# Patient Record
Sex: FEMALE | Race: WHITE | NOT HISPANIC OR LATINO | Employment: UNEMPLOYED | URBAN - METROPOLITAN AREA
[De-identification: names, ages, dates, MRNs, and addresses within clinical notes are randomized per-mention and may not be internally consistent; named-entity substitution may affect disease eponyms.]

---

## 2023-01-01 ENCOUNTER — APPOINTMENT (OUTPATIENT)
Dept: CARDIOLOGY | Facility: CLINIC | Age: 88
End: 2023-01-01
Payer: COMMERCIAL

## 2023-01-01 ENCOUNTER — APPOINTMENT (OUTPATIENT)
Dept: PRIMARY CARE | Facility: CLINIC | Age: 88
End: 2023-01-01
Payer: COMMERCIAL

## 2023-01-01 ENCOUNTER — ANCILLARY PROCEDURE (OUTPATIENT)
Dept: RADIOLOGY | Facility: CLINIC | Age: 88
End: 2023-01-01
Payer: COMMERCIAL

## 2023-01-01 ENCOUNTER — OFFICE VISIT (OUTPATIENT)
Dept: PRIMARY CARE | Facility: CLINIC | Age: 88
End: 2023-01-01
Payer: COMMERCIAL

## 2023-01-01 ENCOUNTER — LAB (OUTPATIENT)
Dept: LAB | Facility: LAB | Age: 88
End: 2023-01-01
Payer: COMMERCIAL

## 2023-01-01 ENCOUNTER — TELEPHONE (OUTPATIENT)
Dept: PRIMARY CARE | Facility: CLINIC | Age: 88
End: 2023-01-01
Payer: COMMERCIAL

## 2023-01-01 VITALS
DIASTOLIC BLOOD PRESSURE: 70 MMHG | TEMPERATURE: 96.4 F | OXYGEN SATURATION: 96 % | SYSTOLIC BLOOD PRESSURE: 108 MMHG | HEART RATE: 70 BPM | WEIGHT: 122 LBS | BODY MASS INDEX: 22.31 KG/M2

## 2023-01-01 DIAGNOSIS — Z00.00 ROUTINE GENERAL MEDICAL EXAMINATION AT HEALTH CARE FACILITY: ICD-10-CM

## 2023-01-01 DIAGNOSIS — Z13.29 THYROID DISORDER SCREENING: ICD-10-CM

## 2023-01-01 DIAGNOSIS — Z12.31 BREAST CANCER SCREENING BY MAMMOGRAM: ICD-10-CM

## 2023-01-01 DIAGNOSIS — K50.919 CROHN'S DISEASE WITH COMPLICATION, UNSPECIFIED GASTROINTESTINAL TRACT LOCATION (MULTI): ICD-10-CM

## 2023-01-01 DIAGNOSIS — N39.41 URGE INCONTINENCE: ICD-10-CM

## 2023-01-01 DIAGNOSIS — Z13.220 LIPID SCREENING: ICD-10-CM

## 2023-01-01 DIAGNOSIS — Z78.0 POST-MENOPAUSAL: ICD-10-CM

## 2023-01-01 DIAGNOSIS — E53.8 VITAMIN B 12 DEFICIENCY: ICD-10-CM

## 2023-01-01 DIAGNOSIS — Z00.00 MEDICARE ANNUAL WELLNESS VISIT, SUBSEQUENT: Primary | ICD-10-CM

## 2023-01-01 DIAGNOSIS — I10 BENIGN ESSENTIAL HYPERTENSION: ICD-10-CM

## 2023-01-01 LAB
ALBUMIN SERPL BCP-MCNC: 4.3 G/DL (ref 3.4–5)
ALP SERPL-CCNC: 54 U/L (ref 33–136)
ALT SERPL W P-5'-P-CCNC: 13 U/L (ref 7–45)
ANION GAP SERPL CALC-SCNC: 15 MMOL/L (ref 10–20)
AST SERPL W P-5'-P-CCNC: 19 U/L (ref 9–39)
BILIRUB SERPL-MCNC: 0.7 MG/DL (ref 0–1.2)
BUN SERPL-MCNC: 19 MG/DL (ref 6–23)
CALCIUM SERPL-MCNC: 9.7 MG/DL (ref 8.6–10.3)
CHLORIDE SERPL-SCNC: 103 MMOL/L (ref 98–107)
CHOLEST SERPL-MCNC: 143 MG/DL (ref 0–199)
CHOLESTEROL/HDL RATIO: 3.1
CO2 SERPL-SCNC: 29 MMOL/L (ref 21–32)
CREAT SERPL-MCNC: 0.8 MG/DL (ref 0.5–1.05)
ERYTHROCYTE [DISTWIDTH] IN BLOOD BY AUTOMATED COUNT: 13.4 % (ref 11.5–14.5)
GFR SERPL CREATININE-BSD FRML MDRD: 72 ML/MIN/1.73M*2
GLUCOSE SERPL-MCNC: 108 MG/DL (ref 74–99)
HCT VFR BLD AUTO: 33.6 % (ref 36–46)
HDLC SERPL-MCNC: 46.3 MG/DL
HGB BLD-MCNC: 11.3 G/DL (ref 12–16)
LDLC SERPL CALC-MCNC: 72 MG/DL (ref 140–190)
MCH RBC QN AUTO: 31.5 PG (ref 26–34)
MCHC RBC AUTO-ENTMCNC: 33.6 G/DL (ref 32–36)
MCV RBC AUTO: 94 FL (ref 80–100)
NON HDL CHOLESTEROL: 97 MG/DL (ref 0–149)
NRBC BLD-RTO: 0 /100 WBCS (ref 0–0)
PLATELET # BLD AUTO: 236 X10*3/UL (ref 150–450)
PMV BLD AUTO: 10.1 FL (ref 7.5–11.5)
POC APPEARANCE, URINE: CLEAR
POC BILIRUBIN, URINE: NEGATIVE
POC BLOOD, URINE: NEGATIVE
POC COLOR, URINE: NORMAL
POC GLUCOSE, URINE: NEGATIVE MG/DL
POC KETONES, URINE: NEGATIVE MG/DL
POC LEUKOCYTES, URINE: NEGATIVE
POC NITRITE,URINE: NEGATIVE
POC PH, URINE: 7 PH
POC PROTEIN, URINE: NEGATIVE MG/DL
POC SPECIFIC GRAVITY, URINE: 1.01
POC UROBILINOGEN, URINE: 1 EU/DL
POTASSIUM SERPL-SCNC: 4.1 MMOL/L (ref 3.5–5.3)
PROT SERPL-MCNC: 6.4 G/DL (ref 6.4–8.2)
RBC # BLD AUTO: 3.59 X10*6/UL (ref 4–5.2)
SODIUM SERPL-SCNC: 143 MMOL/L (ref 136–145)
TRIGL SERPL-MCNC: 122 MG/DL (ref 0–149)
TSH SERPL-ACNC: 1.09 MIU/L (ref 0.44–3.98)
VIT B12 SERPL-MCNC: 743 PG/ML (ref 211–911)
VLDL: 24 MG/DL (ref 0–40)
WBC # BLD AUTO: 7.2 X10*3/UL (ref 4.4–11.3)

## 2023-01-01 PROCEDURE — 36415 COLL VENOUS BLD VENIPUNCTURE: CPT

## 2023-01-01 PROCEDURE — 99213 OFFICE O/P EST LOW 20 MIN: CPT

## 2023-01-01 PROCEDURE — 80053 COMPREHEN METABOLIC PANEL: CPT

## 2023-01-01 PROCEDURE — 1170F FXNL STATUS ASSESSED: CPT

## 2023-01-01 PROCEDURE — 77080 DXA BONE DENSITY AXIAL: CPT | Performed by: RADIOLOGY

## 2023-01-01 PROCEDURE — 81002 URINALYSIS NONAUTO W/O SCOPE: CPT

## 2023-01-01 PROCEDURE — 80061 LIPID PANEL: CPT

## 2023-01-01 PROCEDURE — 3078F DIAST BP <80 MM HG: CPT

## 2023-01-01 PROCEDURE — 82607 VITAMIN B-12: CPT

## 2023-01-01 PROCEDURE — 1159F MED LIST DOCD IN RCRD: CPT

## 2023-01-01 PROCEDURE — 1160F RVW MEDS BY RX/DR IN RCRD: CPT

## 2023-01-01 PROCEDURE — 3074F SYST BP LT 130 MM HG: CPT

## 2023-01-01 PROCEDURE — 85027 COMPLETE CBC AUTOMATED: CPT

## 2023-01-01 PROCEDURE — 77080 DXA BONE DENSITY AXIAL: CPT

## 2023-01-01 PROCEDURE — G0439 PPPS, SUBSEQ VISIT: HCPCS

## 2023-01-01 PROCEDURE — 84443 ASSAY THYROID STIM HORMONE: CPT

## 2023-01-01 PROCEDURE — G0444 DEPRESSION SCREEN ANNUAL: HCPCS

## 2023-01-01 RX ORDER — LABETALOL 300 MG/1
1 TABLET, FILM COATED ORAL EVERY 12 HOURS
COMMUNITY
Start: 2016-01-25

## 2023-01-01 RX ORDER — SULFAMETHOXAZOLE AND TRIMETHOPRIM 800; 160 MG/1; MG/1
1 TABLET ORAL 2 TIMES DAILY
COMMUNITY
Start: 2022-08-30 | End: 2023-01-01 | Stop reason: ALTCHOICE

## 2023-01-01 RX ORDER — BENZONATATE 200 MG/1
200 CAPSULE ORAL 3 TIMES DAILY PRN
COMMUNITY
Start: 2022-01-01 | End: 2023-01-01 | Stop reason: ALTCHOICE

## 2023-01-01 RX ORDER — NEBULIZER AND COMPRESSOR
1 EACH MISCELLANEOUS DAILY
Qty: 1 EACH | Refills: 0 | Status: SHIPPED | OUTPATIENT
Start: 2023-01-01 | End: 2023-01-01 | Stop reason: WASHOUT

## 2023-01-01 RX ORDER — MULTIVITAMIN
1 TABLET ORAL DAILY
COMMUNITY

## 2023-01-01 RX ORDER — GABAPENTIN 300 MG/1
1 CAPSULE ORAL EVERY 8 HOURS
COMMUNITY
Start: 2016-07-14

## 2023-01-01 RX ORDER — VALSARTAN AND HYDROCHLOROTHIAZIDE 320; 25 MG/1; MG/1
1 TABLET, FILM COATED ORAL DAILY
COMMUNITY
Start: 2016-01-25

## 2023-01-01 RX ORDER — DEXTROMETHORPHAN HYDROBROMIDE, GUAIFENESIN 5; 100 MG/5ML; MG/5ML
2 LIQUID ORAL 2 TIMES DAILY
COMMUNITY
Start: 2019-01-14

## 2023-01-01 RX ORDER — OSELTAMIVIR PHOSPHATE 75 MG/1
75 CAPSULE ORAL 2 TIMES DAILY
COMMUNITY
Start: 2022-01-01 | End: 2023-01-01 | Stop reason: ALTCHOICE

## 2023-01-01 ASSESSMENT — ACTIVITIES OF DAILY LIVING (ADL)
MANAGING_FINANCES: NEEDS ASSISTANCE
TAKING_MEDICATION: INDEPENDENT
DRESSING: INDEPENDENT
BATHING: INDEPENDENT
DOING_HOUSEWORK: INDEPENDENT
GROCERY_SHOPPING: INDEPENDENT

## 2023-01-01 ASSESSMENT — PATIENT HEALTH QUESTIONNAIRE - PHQ9
2. FEELING DOWN, DEPRESSED OR HOPELESS: NOT AT ALL
1. LITTLE INTEREST OR PLEASURE IN DOING THINGS: NOT AT ALL
SUM OF ALL RESPONSES TO PHQ9 QUESTIONS 1 AND 2: 0

## 2023-01-01 ASSESSMENT — ENCOUNTER SYMPTOMS
HEMATOLOGIC/LYMPHATIC NEGATIVE: 1
RESPIRATORY NEGATIVE: 1
ARTHRALGIAS: 1
PSYCHIATRIC NEGATIVE: 1
CONSTITUTIONAL NEGATIVE: 1
CARDIOVASCULAR NEGATIVE: 1
NEUROLOGICAL NEGATIVE: 1
GASTROINTESTINAL NEGATIVE: 1
ALLERGIC/IMMUNOLOGIC NEGATIVE: 1

## 2023-07-26 PROBLEM — N39.0 ACUTE UTI: Status: ACTIVE | Noted: 2023-01-01

## 2023-07-26 PROBLEM — R94.31 ABNORMAL EKG: Status: ACTIVE | Noted: 2023-01-01

## 2023-07-26 PROBLEM — D64.9 ANEMIA: Status: ACTIVE | Noted: 2023-01-01

## 2023-07-26 PROBLEM — L08.9 SKIN INFECTION: Status: ACTIVE | Noted: 2023-01-01

## 2023-07-26 PROBLEM — I07.1 TRACE TRICUSPID REGURGITATION BY PRIOR ECHOCARDIOGRAM: Status: ACTIVE | Noted: 2023-01-01

## 2023-07-26 PROBLEM — I10 BENIGN ESSENTIAL HYPERTENSION: Status: ACTIVE | Noted: 2023-01-01

## 2023-07-26 PROBLEM — I34.0 NONRHEUMATIC MITRAL (VALVE) INSUFFICIENCY: Status: ACTIVE | Noted: 2023-01-01

## 2023-07-26 PROBLEM — M16.0 PRIMARY OSTEOARTHRITIS OF BOTH HIPS: Status: ACTIVE | Noted: 2023-01-01

## 2023-07-26 PROBLEM — R60.0 BILATERAL LEG EDEMA: Status: ACTIVE | Noted: 2023-01-01

## 2023-07-26 PROBLEM — I34.0 MILD MITRAL REGURGITATION: Status: ACTIVE | Noted: 2023-01-01

## 2023-07-26 PROBLEM — R31.0 FRANK HEMATURIA: Status: ACTIVE | Noted: 2023-01-01

## 2023-07-26 PROBLEM — R30.0 BURNING WITH URINATION: Status: ACTIVE | Noted: 2023-01-01

## 2023-07-26 PROBLEM — K50.90 CROHN'S DISEASE (MULTI): Status: ACTIVE | Noted: 2023-01-01

## 2023-09-29 PROBLEM — R31.0 FRANK HEMATURIA: Status: RESOLVED | Noted: 2023-01-01 | Resolved: 2023-01-01

## 2023-09-29 PROBLEM — Z00.00 MEDICARE ANNUAL WELLNESS VISIT, SUBSEQUENT: Status: ACTIVE | Noted: 2023-01-01

## 2023-09-29 PROBLEM — R30.0 BURNING WITH URINATION: Status: RESOLVED | Noted: 2023-01-01 | Resolved: 2023-01-01

## 2023-09-29 PROBLEM — N39.41 URGE INCONTINENCE: Status: ACTIVE | Noted: 2023-01-01

## 2023-09-29 PROBLEM — N39.0 ACUTE UTI: Status: RESOLVED | Noted: 2023-01-01 | Resolved: 2023-01-01

## 2023-09-29 NOTE — ASSESSMENT & PLAN NOTE
Pt reports she is having urge incontinence, unable to control voiding. Sometimes twice a day she is unable to hold her urine until she is able to get the toilet. Sometimes after she has been out for a long time, and in the middle of night she tries to get to the bedside commode in time.  Denies pain and burning during urination.     Complete UA dip to rule out UTI  Consider bladder training and timing.   Practice kegel exercises throughout the day.  Reduce water intake before bedtime.

## 2023-09-29 NOTE — ASSESSMENT & PLAN NOTE
Wellness screenings/Immunizations:  Flu vaccination: Recommended annually  PCV13: 2019, consider PCV20 in 2024  Shingrix vaccine: 2021  Colon cancer screening: No longer indicated for preventative screening due to age  Mammogram: No longer indicated for preventative screening due to age  DEXA scan: Recommended and ordered

## 2023-09-29 NOTE — ASSESSMENT & PLAN NOTE
Pt asymptomatic at this time. No current medication. Established with gastroenterology.    Continue therapeutic diet

## 2023-09-29 NOTE — ASSESSMENT & PLAN NOTE
Blood pressure in office 108/70. Pt does not take blood pressure at home. Denies symptoms of hypotension.  Follows with Dr. Perez, next appointment within next month.    Encourage to take blood pressures at home.   Continue current therapy.

## 2023-09-29 NOTE — PROGRESS NOTES
Subjective   Patient ID: Anay Miller is a 87 y.o. female who presents for medicare wellness and to [facility] care.    Pt reports she is having urge incontinence, unable to control voiding. About twice per day she has episodes of incontinence due to not being able to hold her urine until she is able to get to toilet.  Often rushing to toilet/commode upon returning home after being out of the house for a long time and in the middle of the night.   Denies symptoms of pelvic floor prolapse. Denies urinary frequency, burning or pain with urination.     Diet: one cup of coffee per day. Drinks a couple glasses of cranberry juice. Sweets. Microwave dinners. Meatloaf. Mashed potatoes. Pastry and banana for breakfast. Lunchmeat sandwich.   Exercise: no regular exercise, housework.   Weight: stable.   Water: 1 cup per day.   Sleep: Good.   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 . 1 floor with basement (does not go down steps, does not get down steps very well), one grown daughter close by and another son, 1 dog ([employer] service dog).   Professional: homemaker    Review of Systems   Constitutional: Negative.    HENT: Negative.     Respiratory: Negative.     Cardiovascular: Negative.    Gastrointestinal: Negative.    Genitourinary:  Positive for urgency.   Musculoskeletal:  Positive for arthralgias.   Skin: Negative.    Allergic/Immunologic: Negative.    Neurological: Negative.    Hematological: Negative.    Psychiatric/Behavioral: Negative.          Current Outpatient Medications   Medication Sig Dispense Refill    acetaminophen (Tylenol 8 Hour) 650 mg ER tablet Take 2 tablets (1,300 mg) by mouth 2 times a day.      gabapentin (Neurontin) 300 mg capsule Take 1 capsule (300 mg) by mouth every 8 hours.      labetalol (Normodyne) 300 mg tablet Take 1 tablet (300 mg) by mouth every 12 hours.      multivitamin tablet Take 1 tablet by mouth once daily.       valsartan-hydrochlorothiazide (Diovan-HCT) 320-25 mg tablet Take 1 tablet by mouth once daily.      miscellaneous medical supply (Blood Pressure Cuff) misc 1 Units once daily. 1 each 0     No current facility-administered medications for this visit.     Past Surgical History:   Procedure Laterality Date    COLONOSCOPY  01/24/2018    Colonoscopy    HYSTERECTOMY  11/09/2016    Hysterectomy    OTHER SURGICAL HISTORY  01/24/2018    Esophagoduodenostomy    TOTAL HIP ARTHROPLASTY  11/09/2016    Total Hip Replacement     Family History   Problem Relation Name Age of Onset    Other (malignant neoplasm) Mother      Coronary artery disease Father      Heart attack Father        Social History     Tobacco Use    Smoking status: Former     Packs/day: .25     Types: Cigarettes   Vaping Use    Vaping Use: Never used   Substance Use Topics    Alcohol use: Not Currently     Alcohol/week: 1.0 standard drink of alcohol     Types: 1 Glasses of wine per week     Comment: half glass a wine sometimes    Drug use: Never        Objective     Visit Vitals  /70 (BP Location: Left arm, Patient Position: Sitting)   Pulse 70   Temp 35.8 °C (96.4 °F)   Wt 55.3 kg (122 lb)   SpO2 96%   BMI 22.31 kg/m²   Smoking Status Former   BSA 1.56 m²        Physical Exam  Constitutional:       Appearance: Normal appearance. She is normal weight.   HENT:      Head: Normocephalic and atraumatic.      Mouth/Throat:      Mouth: Mucous membranes are moist.      Pharynx: Oropharynx is clear.   Eyes:      Extraocular Movements: Extraocular movements intact.   Cardiovascular:      Rate and Rhythm: Normal rate and regular rhythm.      Pulses: Normal pulses.      Heart sounds: Normal heart sounds.   Pulmonary:      Effort: Pulmonary effort is normal.      Breath sounds: Normal breath sounds.   Abdominal:      General: Abdomen is flat. Bowel sounds are normal.      Palpations: Abdomen is soft.   Musculoskeletal:      Cervical back: Neck supple.   Skin:      General: Skin is warm and dry.      Capillary Refill: Capillary refill takes less than 2 seconds.   Neurological:      General: No focal deficit present.      Mental Status: She is alert.           Assessment/Plan   Problem List Items Addressed This Visit       Benign essential hypertension     Blood pressure in office 108/70. Pt does not take blood pressure at home. Denies symptoms of hypotension.  Follows with Dr. Perez, next appointment within next month.    Encourage to take blood pressures at home.   Continue current therapy.          Relevant Medications    miscellaneous medical supply (Blood Pressure Cuff) misc    Other Relevant Orders    Comprehensive metabolic panel    CBC    Follow Up In Primary Care - Established    Crohn's disease (CMS/HCC)     Pt asymptomatic at this time. No current medication. Established with gastroenterology.    Continue therapeutic diet         Relevant Orders    Comprehensive metabolic panel    CBC    Urge incontinence     Pt reports she is having urge incontinence, unable to control voiding. Sometimes twice a day she is unable to hold her urine until she is able to get the toilet. Sometimes after she has been out for a long time, and in the middle of night she tries to get to the bedside commode in time.  Denies pain and burning during urination.     Complete UA dip to rule out UTI  Consider bladder training and timing.   Practice kegel exercises throughout the day.  Reduce water intake before bedtime.         Relevant Orders    POCT UA (nonautomated) manually resulted (Completed)    Medicare annual wellness visit, subsequent - Primary     Wellness screenings/Immunizations:  Flu vaccination: Recommended annually  PCV13: 2019, consider PCV20 in 2024  Shingrix vaccine: 2021  Colon cancer screening: No longer indicated for preventative screening due to age  Mammogram: No longer indicated for preventative screening due to age  DEXA scan: Recommended and ordered          Other Visit  Diagnoses       Breast cancer screening by mammogram        Lipid screening        Relevant Orders    Lipid panel    Post-menopausal        Relevant Orders    XR DEXA bone density    Thyroid disorder screening        Relevant Orders    Tsh With Reflex To Free T4 If Abnormal    Vitamin B 12 deficiency        Relevant Orders    Vitamin B12    Routine general medical examination at health care facility                All pertinent lab work and results were reviewed with patient.     Follow up with me in 6 months     Vy Travis, APRN-CNP

## 2023-12-01 ENCOUNTER — APPOINTMENT (OUTPATIENT)
Dept: CARDIOLOGY | Facility: CLINIC | Age: 88
End: 2023-12-01
Payer: COMMERCIAL

## 2023-12-05 NOTE — TELEPHONE ENCOUNTER
Working on getting the EMS report from [facility]- will complete and hand over to you once I see it

## 2023-12-18 ENCOUNTER — APPOINTMENT (OUTPATIENT)
Dept: PRIMARY CARE | Facility: CLINIC | Age: 88
End: 2023-12-18
Payer: COMMERCIAL

## 2024-03-29 ENCOUNTER — APPOINTMENT (OUTPATIENT)
Dept: PRIMARY CARE | Facility: CLINIC | Age: 89
End: 2024-03-29
Payer: COMMERCIAL

## 2024-10-30 NOTE — PATIENT INSTRUCTIONS
Thank you for coming to see me today.  If you have any questions or concerns following our visit, please contact the office.  Phone: [phone]    Follow up with me in 6 months.     1)  Purchase a blood pressure cuff. Check blood pressures at home 3-4 times per week, making sure you rest 5 minutes prior to taking a reading.  Write the readings down on blood pressure log and bring this log to your next visit with me.     2) Obtain fasting labwork, [address] down the laboy.    3) Increase water intake to 64oz per day, reduce salt intake.     4) Practice Kegel exercises several times during the day, 10 reps for 6 seconds a piece to stregnthen pelvice floor.    5) Practice bladder training, schedule visits to toilet to void prior to feeling intense urge. Reduce fluid intake 2-3 hours before bedtime.     6) Please schedule a bone density scan - please call [phone] or stop to 's office (in the lab office) on your way out today.     7) START OTC antihistamine for seasonal allergies  
no